# Patient Record
Sex: FEMALE | Race: WHITE | URBAN - METROPOLITAN AREA
[De-identification: names, ages, dates, MRNs, and addresses within clinical notes are randomized per-mention and may not be internally consistent; named-entity substitution may affect disease eponyms.]

---

## 2022-05-23 ENCOUNTER — DOCUMENTATION ONLY (OUTPATIENT)
Dept: PEDIATRIC CARDIOLOGY | Facility: CLINIC | Age: 22
End: 2022-05-23

## 2022-12-13 ENCOUNTER — TELEPHONE (OUTPATIENT)
Dept: PEDIATRIC CARDIOLOGY | Facility: CLINIC | Age: 22
End: 2022-12-13
Payer: MEDICAID

## 2022-12-13 NOTE — TELEPHONE ENCOUNTER
Pt called asking about what anxiety medication she would be able to take with QT prolonged and wanted to come in to see Dr. Alonso for appt abt it, but could possibly just be helped over the phone

## 2022-12-13 NOTE — TELEPHONE ENCOUNTER
Last eCw note in chart. History of prolonged mild QTc prolongation (454 msec). Would like advice on Anxiety medication she could take. Do you want to see her in office for this or advise on medications she could take with history? She's not due for follow up until May 2023.    Thanks,   Melissa

## 2022-12-13 NOTE — PROGRESS NOTES
MALENA ERICKSON   21 Y old Female, : 2000   Account Number: [id number]   [id number] [id number]   Home: [phone]    Guarantor: NEIDA ERICKSON Insurance: [facility]   PCP: [facility] Referring: Tiffany German NP    Appointment Facility: [facility]     2022 Progress Notes: Dalia Tejeda MD          Reason for Appointment   1. Aortic stenosis established patient   History of Present Illness   Aortic stenosis:   I had the pleasure of seeing this patient in the [place] satellite office today. 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 She also has a bicuspid aortic valve with mild insufficiency. The patient a history of borderline prolonged QTc on electrocardiogram. At her last visit, her QT interval was stable at 461 msec. The patient was last seen 1 year ago and returns today for follow up since obtaining a cMR on 2021 which demonstrated hypoplastic ascending aorta with isthmus coarctation. There are no complaints of chest pain, dizziness, exercise intolerance, tachycardia, palpitations, syncope, shortness of breath, or arrhythmia.    Current Medications   None      Past Medical History   Coarctation of the aorta status post repair.     Bicuspid aortic valve - no stenosis.     Aortic valve insufficiency, mild.     Prolonged QT interval.     MRA brain 2021 - no berry aneurysms noted .     Seasonal/ Environmental Allergies.     Pregnancy - uncomplicated .     Surgical History   Aortic arch reconstruction with bovine pericardium Dr. VALERIE [facility] at [facility] [place] 2002   Family History   Mother: alive   Father: alive, Myocardial Infarction at 34 Years of Age, S/P Stent Placement   Brother: alive, diagnosed with Hypercholesterolemia, Congenital Heart Disease   Maternal Grand Mother: , diagnosed  with Diabetes   Paternal Grand Mother: alive, diagnosed with Hypercholesterolemia, Diabetes   1 brother(s) - healthy.    There is no direct family history of sudden death, arrhythmia, stroke, cancer, or other inheritable disorders.   Social History   Observations: no.   Language: no language barriers.   Tobacco Use Are you a: never smoker.   Smokers in the household: No.   Exercise/activities: None.   Number of siblings: 1.   Caffeine: occasionally.   Alcohol: no.   Drugs: no.    Allergies   Reglan   Hospitalization/Major Diagnostic Procedure   As related to aortic arch reconstruction 11/2002   Review of Systems   Genetics:   Syndrome none.   Constitutional:   Fatigue none. Fever none. Loss of appetite none. Weakness none. Weight no problems reported.   Neurologic:   Syncope none. Dizziness none. Headaches none. Seizures absent.   Ophthalmologic:   Contacts or glasses none. Diminished vision none.   Ear, nose, throat:   Eyes no problems present. Mouth and throat no problems noted. Upper airway obstruction none present. Nasal congestion none.   Respiratory:   Asthma none. Tachypnea none. Cough none. Shortness of breath associated with chest tightness or wheeze. Wheezing none.   Cardiovascular:   See HPI for details.   Gastrointestinal:   Stomach no nausea or vomiting. Bowel no diarrhea, no constipation. Abdomen No complaints.   Endocrine:   Thyroid disease none. Diabetes none.   Genitourinary:   Renal disease no problems noted. Urinary tract infection none.   Musculoskeletal:   Joint pain none. Joint swelling none. Muscle no cramping, aching, or stiffness.   Dermatologic:   Itching none. Rash none.   Hematology, oncology:   Anemia none. Abnormal bleeding none. Clotting disorder none.   Allergy:   Seasonal/Environmental yes. Food none. Latex none.   Psychology:   ADD or ADHD present. Nervousness none . Mental Illness none . Anxiety none. Depression none.      Vital Signs   Ht 169 cm, Wt 90 kg, BMI 31.51, heart rate  (HR) 66 bpm, blood pressure (BP) Right Arm: 123/53 mmHg, repeat blood pressure (BP) Manual: 120/54 mmHg, respiratory rate (RR) 16.   Physical Examination   General:   General Appearance: pleasant. Nutrition well nourished. Distress none. Cyanosis none.   HEENT:   Head: atraumatic, normocephalic.   Neck:   Neck: supple.   Chest:   Shape and Expansion: equal expansion bilaterally, no retractions, no grunting. Chest wall: surgical incisions well healed. Breath Sounds: clear to auscultation, no wheezing, rhonchi, crackles, or stridor.   Heart:   Inspection: normal and acyanotic. Palpation: normal point of maximal impulse. Rate: regular. Rhythm: regular. S1: normal. S2: physiologically split. Clicks: early systolic. Systolic murmurs: II/VI, systolic, medium pitch, left upper sternal border, radiation to the back/left scapula. Diastolic murmurs: none present. Rubs, Gallops: none. Pulses: brachial artery equals femoral artery without delay.   Abdomen:   Shape: normal. Palpation soft. Tenderness: none.   Extremities:   Clubbing: no. Cyanosis: no. Edema: no. Pulses: 2+ bilaterally.   Neurological:   Motor: normal strength bilaterally. Coordination: normal.      Assessments      1. 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 Coarctation of aorta - Q25.1   3. Nonrheumatic aortic (valve) insufficiency - I35.1   In summary, Moira is status post a successful aortic arch reconstruction and coarctation repair. Her mild residual arch narrowing has remained stable since her last evaluation. Her bicuspid aortic valve remains non-stenotic and with only mild insufficiency which is stable as well. We will continue to monitor these. At this point, she does not require any intervention. Lastly, Moira's electrocardiogram today demonstrated mild QTc prolongation (454 msec).which has remained stable. I discussed with her that it would be prudent to avoid drugs on the QT prolonging drug list but if not possible, she would  require EKG monitoring while taking these. I will continue to monitor the QTc closely over time. I will plan to see her back in 1 year. Thank you for allowing me to follow this patient with you.   Procedures   Electrocardiogram:   Axis Superior axis deviation.   Rhythm Normal sinus rhythm.   Cardiac intervals Borderline long QTc (454 msec).   Ventricular forces Inferolateral T wave inversion.   Echocardiogram:   Findings Bicuspid aortic valve with mild aortic valve insufficiency via two separate jets. No aortic valve stenosis. Mild residual coarctation with a peak gradient of 45 mm Hg and a mean of 20 mm Hg. No ascending aorta dilation. No mitral inflow obstruction was present. No significant atrioventricular valve insufficiency. Normal left ventricular size and function. No pericardial effusion.               Preventive Medicine   Counseling: Exercise - No activity restrictions. SBE prophylaxis - None indicated.    Procedure Codes   76867 Doppler Complete   63722 Color Flow   56699 2D Congenital Complete   11272 Electrocardiogram (global)   Follow Up   1 Year (Reason: Echocardiogram,Electrocardiogram)

## 2022-12-14 ENCOUNTER — PATIENT MESSAGE (OUTPATIENT)
Dept: PEDIATRIC CARDIOLOGY | Facility: CLINIC | Age: 22
End: 2022-12-14
Payer: MEDICAID

## 2022-12-14 NOTE — TELEPHONE ENCOUNTER
Left message for patient to call back. Per Dr. Alonso patient can take anxiety medication, but will need to let us know which one because we may need to get an EKG about 1 week after starting to get her QTc due to her history of prolonged QT. Will try to call again later today or message via [place].

## 2022-12-22 NOTE — TELEPHONE ENCOUNTER
Spoke with Moira regarding Dr. Alonso's recommendation. She will call us back with the name of anxiety medication and we will set up an appt for EKG.

## 2023-02-07 ENCOUNTER — OFFICE VISIT (OUTPATIENT)
Dept: PEDIATRIC CARDIOLOGY | Facility: CLINIC | Age: 23
End: 2023-02-07
Payer: MEDICAID

## 2023-02-07 VITALS
SYSTOLIC BLOOD PRESSURE: 136 MMHG | RESPIRATION RATE: 27 BRPM | HEART RATE: 75 BPM | DIASTOLIC BLOOD PRESSURE: 72 MMHG | BODY MASS INDEX: 33.75 KG/M2 | HEIGHT: 66 IN | WEIGHT: 210 LBS

## 2023-02-07 DIAGNOSIS — Q23.1 BICUSPID AORTIC VALVE: ICD-10-CM

## 2023-02-07 DIAGNOSIS — Q24.9 SHONE'S SYNDROME: Primary | ICD-10-CM

## 2023-02-07 DIAGNOSIS — F41.9 ANXIETY: ICD-10-CM

## 2023-02-07 DIAGNOSIS — Q25.1 COARCTATION OF AORTA: ICD-10-CM

## 2023-02-07 PROBLEM — Q24.8 SHONE'S SYNDROME: Status: ACTIVE | Noted: 2021-04-08

## 2023-02-07 PROBLEM — Q23.81 BICUSPID AORTIC VALVE: Status: ACTIVE | Noted: 2023-02-07

## 2023-02-07 PROCEDURE — 3078F PR MOST RECENT DIASTOLIC BLOOD PRESSURE < 80 MM HG: ICD-10-PCS | Mod: CPTII,S$GLB,, | Performed by: PEDIATRICS

## 2023-02-07 PROCEDURE — 3008F PR BODY MASS INDEX (BMI) DOCUMENTED: ICD-10-PCS | Mod: CPTII,S$GLB,, | Performed by: PEDIATRICS

## 2023-02-07 PROCEDURE — 1160F RVW MEDS BY RX/DR IN RCRD: CPT | Mod: CPTII,S$GLB,, | Performed by: PEDIATRICS

## 2023-02-07 PROCEDURE — 3078F DIAST BP <80 MM HG: CPT | Mod: CPTII,S$GLB,, | Performed by: PEDIATRICS

## 2023-02-07 PROCEDURE — 1159F PR MEDICATION LIST DOCUMENTED IN MEDICAL RECORD: ICD-10-PCS | Mod: CPTII,S$GLB,, | Performed by: PEDIATRICS

## 2023-02-07 PROCEDURE — 1160F PR REVIEW ALL MEDS BY PRESCRIBER/CLIN PHARMACIST DOCUMENTED: ICD-10-PCS | Mod: CPTII,S$GLB,, | Performed by: PEDIATRICS

## 2023-02-07 PROCEDURE — 99213 PR OFFICE/OUTPT VISIT, EST, LEVL III, 20-29 MIN: ICD-10-PCS | Mod: 25,S$GLB,, | Performed by: PEDIATRICS

## 2023-02-07 PROCEDURE — 1159F MED LIST DOCD IN RCRD: CPT | Mod: CPTII,S$GLB,, | Performed by: PEDIATRICS

## 2023-02-07 PROCEDURE — 3008F BODY MASS INDEX DOCD: CPT | Mod: CPTII,S$GLB,, | Performed by: PEDIATRICS

## 2023-02-07 PROCEDURE — 3075F PR MOST RECENT SYSTOLIC BLOOD PRESS GE 130-139MM HG: ICD-10-PCS | Mod: CPTII,S$GLB,, | Performed by: PEDIATRICS

## 2023-02-07 PROCEDURE — 99213 OFFICE O/P EST LOW 20 MIN: CPT | Mod: 25,S$GLB,, | Performed by: PEDIATRICS

## 2023-02-07 PROCEDURE — 93000 ELECTROCARDIOGRAM COMPLETE: CPT | Mod: S$GLB,,, | Performed by: PEDIATRICS

## 2023-02-07 PROCEDURE — 93000 PR ELECTROCARDIOGRAM, COMPLETE: ICD-10-PCS | Mod: S$GLB,,, | Performed by: PEDIATRICS

## 2023-02-07 PROCEDURE — 3075F SYST BP GE 130 - 139MM HG: CPT | Mod: CPTII,S$GLB,, | Performed by: PEDIATRICS

## 2023-02-07 RX ORDER — BUPROPION HCL 150 MG
150 TABLET, EXTENDED RELEASE 24 HR ORAL
COMMUNITY
Start: 2023-01-26 | End: 2023-05-18

## 2023-02-07 NOTE — PROGRESS NOTES
Thank you for referring your patient Moira Bravo to the Pediatric Cardiology clinic for consultation. Please review my findings below and feel free to contact for me for any questions or concerns.    Moira Bravo is a 22 y.o. female seen in clinic today accompanied by her spouse and child for Aortic Stenosis    ASSESSMENT/PLAN:  1. Shone's syndrome    2. Coarctation of aorta    3. Bicuspid aortic valve    4. Anxiety  Assessment & Plan:  On Wellbutrin  Normal EKG, no QT prolongation      Preventive Medicine:  SBE prophylaxis - None indicated  Exercise - No activity restrictions    Follow Up:  Follow up in about 3 months (around 5/7/2023) for Recheck with EKG and Echo.      SUBJECTIVE:  HPI  Moira Bravo is a 22 y.o. 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 She also has a bicuspid aortic valve with mild insufficiency. The patient has a history of borderline prolonged QTc on electrocardiogram. At her last visit, her QT interval was stable at 454 msec. The patient was last seen 8 months ago and returns today for early follow up to obtain an electrocardiogram to check her QT interval since beginning anxiety medication. There are no complaints of chest pain, shortness of breath, palpitations, decreased activity, exercise intolerance, tachycardia, dizziness, syncope, documented arrhythmias, or headaches.    Review of patient's allergies indicates:  No Known Allergies    Current Outpatient Medications:     WELLBUTRIN  mg TB24 tablet, Take 150 mg by mouth., Disp: , Rfl:   Past Medical History:   Diagnosis Date    Aortic valve insufficiency     Bicuspid aortic valve     Coarctation of aorta     s/p repair    History of MRI of brain 01/2021    no berry aneurysms noted    Prolonged QT interval     Seasonal allergies       Past Surgical History:   Procedure Laterality Date    AORTIC ARCH REPAIR  2002    with bovine pericardium by   "VALERIE Young at [facility]     Family History   Problem Relation Age of Onset    Heart attacks under age 50 Father 34        s/p stent placement    Congenital heart disease Brother     Hyperlipidemia Brother     Diabetes Maternal Grandmother     Diabetes Paternal Grandmother     Hyperlipidemia Paternal Grandmother       There is no direct family history of sudden death, arrythmia, hypertension, stroke, cancer , or other inheritable disorders.  Social History     Socioeconomic History    Marital status: Single   Tobacco Use    Smoking status: Unknown   Social History Narrative    Lives with grandma. In college. Regular caffeine through tea and soda. Regular exercise.        Interval Hospitalizations/Procedures:  none    Review of Systems   A comprehensive review of symptoms was completed and negative except as noted above.    OBJECTIVE:  Vital signs  Vitals:    02/07/23 1024 02/07/23 1025   BP: (!) 145/79 136/72   BP Location: Right arm Right arm   Patient Position: Lying Lying   BP Method: Large (Automatic) Large (Manual)   Pulse: 75    Resp: (!) 27    Weight: 95.2 kg (209 lb 15.8 oz)    Height: 5' 6.14" (1.68 m)       Body mass index is 33.75 kg/m².    Physical Exam  Vitals reviewed.   Constitutional:       General: She is not in acute distress.     Appearance: Normal appearance. She is not ill-appearing, toxic-appearing or diaphoretic.   HENT:      Head: Normocephalic and atraumatic.   Cardiovascular:      Rate and Rhythm: Normal rate and regular rhythm.      Pulses: Normal pulses.           Radial pulses are 2+ on the right side.        Femoral pulses are 2+ on the right side.     Heart sounds: S1 normal and S2 normal. Murmur heard.     No friction rub. No gallop.   Pulmonary:      Effort: Pulmonary effort is normal.      Breath sounds: Normal breath sounds.   Skin:     General: Skin is warm and dry.      Capillary Refill: Capillary refill takes less than 2 seconds.   Neurological:      Mental " Status: She is alert.   Psychiatric:         Mood and Affect: Mood normal.        Electrocardiogram:  Normal sinus rhythm with normal cardiac intervals, superior axis deviation, non-specific ST-T wave flattening, normal QTc    Echocardiogram:  not performed today        Adolph Alonso MD  [facility]  [facility] [address] [address]  Dept: [phone]  Dept Fax: [phone]

## 2023-05-18 ENCOUNTER — OFFICE VISIT (OUTPATIENT)
Dept: PEDIATRIC CARDIOLOGY | Facility: CLINIC | Age: 23
End: 2023-05-18
Payer: MEDICAID

## 2023-05-18 VITALS
HEIGHT: 66 IN | WEIGHT: 211.19 LBS | SYSTOLIC BLOOD PRESSURE: 119 MMHG | DIASTOLIC BLOOD PRESSURE: 63 MMHG | RESPIRATION RATE: 22 BRPM | HEART RATE: 62 BPM | BODY MASS INDEX: 33.94 KG/M2

## 2023-05-18 DIAGNOSIS — Z87.898 HISTORY OF PROLONGED Q-T INTERVAL ON ECG: ICD-10-CM

## 2023-05-18 DIAGNOSIS — Q23.1 BICUSPID AORTIC VALVE: ICD-10-CM

## 2023-05-18 DIAGNOSIS — Q25.1 COARCTATION OF AORTA: Primary | ICD-10-CM

## 2023-05-18 PROBLEM — R94.110: Status: ACTIVE | Noted: 2023-05-18

## 2023-05-18 PROCEDURE — 1159F PR MEDICATION LIST DOCUMENTED IN MEDICAL RECORD: ICD-10-PCS | Mod: CPTII,S$GLB,, | Performed by: PEDIATRICS

## 2023-05-18 PROCEDURE — 3008F PR BODY MASS INDEX (BMI) DOCUMENTED: ICD-10-PCS | Mod: CPTII,S$GLB,, | Performed by: PEDIATRICS

## 2023-05-18 PROCEDURE — 3008F BODY MASS INDEX DOCD: CPT | Mod: CPTII,S$GLB,, | Performed by: PEDIATRICS

## 2023-05-18 PROCEDURE — 99214 OFFICE O/P EST MOD 30 MIN: CPT | Mod: 25,S$GLB,, | Performed by: PEDIATRICS

## 2023-05-18 PROCEDURE — 93000 PR ELECTROCARDIOGRAM, COMPLETE: ICD-10-PCS | Mod: S$GLB,,, | Performed by: PEDIATRICS

## 2023-05-18 PROCEDURE — 3078F PR MOST RECENT DIASTOLIC BLOOD PRESSURE < 80 MM HG: ICD-10-PCS | Mod: CPTII,S$GLB,, | Performed by: PEDIATRICS

## 2023-05-18 PROCEDURE — 1159F MED LIST DOCD IN RCRD: CPT | Mod: CPTII,S$GLB,, | Performed by: PEDIATRICS

## 2023-05-18 PROCEDURE — 93000 ELECTROCARDIOGRAM COMPLETE: CPT | Mod: S$GLB,,, | Performed by: PEDIATRICS

## 2023-05-18 PROCEDURE — 99214 PR OFFICE/OUTPT VISIT, EST, LEVL IV, 30-39 MIN: ICD-10-PCS | Mod: 25,S$GLB,, | Performed by: PEDIATRICS

## 2023-05-18 PROCEDURE — 1160F RVW MEDS BY RX/DR IN RCRD: CPT | Mod: CPTII,S$GLB,, | Performed by: PEDIATRICS

## 2023-05-18 PROCEDURE — 1160F PR REVIEW ALL MEDS BY PRESCRIBER/CLIN PHARMACIST DOCUMENTED: ICD-10-PCS | Mod: CPTII,S$GLB,, | Performed by: PEDIATRICS

## 2023-05-18 PROCEDURE — 3074F SYST BP LT 130 MM HG: CPT | Mod: CPTII,S$GLB,, | Performed by: PEDIATRICS

## 2023-05-18 PROCEDURE — 3078F DIAST BP <80 MM HG: CPT | Mod: CPTII,S$GLB,, | Performed by: PEDIATRICS

## 2023-05-18 PROCEDURE — 3074F PR MOST RECENT SYSTOLIC BLOOD PRESSURE < 130 MM HG: ICD-10-PCS | Mod: CPTII,S$GLB,, | Performed by: PEDIATRICS

## 2023-05-18 NOTE — ASSESSMENT & PLAN NOTE
Moira is status post a successful aortic arch reconstruction and coarctation repair. Her mild residual arch narrowing has remained stable over the last several evaluations. Additionally, she has excellent lower extremity pulses and no upper/lower extremity blood pressure gradient.  At this point, the residual coarctation does not require treatment. I will continue to monitor over time.   Patients with a history of coarctation are at increased risk for hypertension. Today her blood pressure is acceptable and improved since her last visit in February.  I will continue to follow closely for the need for antihypertensives.

## 2023-05-18 NOTE — ASSESSMENT & PLAN NOTE
Moira's electrocardiogram today did not demonstrate QTc prolongation but it has been borderline to mildly elevated in the past. I would recommend she continue to avoid drugs on the QT prolonging drug list but if not possible, she would require EKG monitoring while taking these. I will continue to monitor the QTc closely over time.

## 2023-05-18 NOTE — PROGRESS NOTES
Thank you for referring your patient Moira Bravo to the Pediatric Cardiology clinic for consultation. Please review my findings below and feel free to contact for me for any questions or concerns.    Moira Bravo is a 22 y.o. female seen in clinic today for Aortic Stenosis    ASSESSMENT/PLAN:  1. Coarctation of aorta  Assessment & Plan:  Moira is status post a successful aortic arch reconstruction and coarctation repair. Her mild residual arch narrowing has remained stable over the last several evaluations. Additionally, she has excellent lower extremity pulses and no upper/lower extremity blood pressure gradient.  At this point, the residual coarctation does not require treatment. I will continue to monitor over time.   Patients with a history of coarctation are at increased risk for hypertension. Today her blood pressure is acceptable and improved since her last visit in February.  I will continue to follow closely for the need for antihypertensives.      Orders:  -     Pediatric Echo; Future    2. Bicuspid aortic valve  Assessment & Plan:  Moira has a bicuspid aortic valve with no stenosis and mild insufficiency. There is no associated ascending aorta dilation. This remains stable since her evaluation last year. She requires no treatment at this point. Although unlikely to cause her difficulty until she ages further, she will need to be followed for the development of aortic stenosis and for ascending aortopathy.    Orders:  -     Pediatric Echo; Future    3. History of prolonged Q-T interval on ECG  Assessment & Plan:  Moira's electrocardiogram today did not demonstrate QTc prolongation but it has been borderline to mildly elevated in the past. I would recommend she continue to avoid drugs on the QT prolonging drug list but if not possible, she would require EKG monitoring while taking these. I will continue to monitor the QTc closely over time.        Preventive Medicine:  SBE prophylaxis - None  indicated  Exercise - No activity restrictions    Follow Up:  Follow up in about 1 year (around 5/18/2024) for Recheck with EKG and Echo, Manual Blood Pressure.    SUBJECTIVE:  PAOLA Bravo is a 22 y.o. 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 She also has a bicuspid aortic valve with mild insufficiency. The patient has a history of borderline prolonged QTc on electrocardiogram. At her second to last visit, her QT interval was stable at 454 msec. The patient was last seen three months ago and returns today for a follow up. Complaints include none.  There are no complaints of chest pain, shortness of breath, palpitations, decreased activity, exercise intolerance, tachycardia, dizziness, syncope, or documented arrhythmias.    Review of patient's allergies indicates:  No Known Allergies  No current outpatient medications on file.  Past Medical History:   Diagnosis Date    Aortic valve insufficiency     Bicuspid aortic valve     Coarctation of aorta     s/p repair    History of MRI of brain 01/2021    no berry aneurysms noted    Prolonged QT interval     Seasonal allergies       Past Surgical History:   Procedure Laterality Date    AORTIC ARCH REPAIR  2002    with bovine pericardium by Dr. VALERIE [facility] at [facility]     Family History   Problem Relation Age of Onset    Heart attacks under age 50 Father 34        s/p stent placement    Congenital heart disease Brother     Hyperlipidemia Brother     Diabetes Maternal Grandmother     Diabetes Paternal Grandmother     Hyperlipidemia Paternal Grandmother       There is no direct family history of sudden death, arrythmia, hypertension, stroke, cancer , or other inheritable disorders.  Social History     Socioeconomic History    Marital status: Single   Tobacco Use    Smoking status: Unknown   Social History Narrative    Lives with grandma. In college. Regular caffeine through  "tea and soda. Regular exercise.        Review of Systems   A comprehensive review of symptoms was completed and negative except as noted above.    OBJECTIVE:  Vital signs  Vitals:    05/18/23 1022 05/18/23 1024 05/18/23 1108   BP: 132/70 128/64 119/63   BP Location: Right arm Right arm Left leg   Patient Position: Lying Lying Lying   BP Method: Large (Automatic) Large (Manual) Large (Automatic)   Pulse: 62     Resp: (!) 22     Weight: 95.8 kg (211 lb 3.2 oz)     Height: 5' 5.75" (1.67 m)        Body mass index is 34.35 kg/m².    Physical Exam  Vitals reviewed.   Constitutional:       General: She is not in acute distress.     Appearance: Normal appearance.   HENT:      Head: Normocephalic and atraumatic.      Nose: Nose normal.      Mouth/Throat:      Mouth: Mucous membranes are moist.   Cardiovascular:      Rate and Rhythm: Normal rate and regular rhythm.      Pulses: Normal pulses.           Radial pulses are 2+ on the right side.        Femoral pulses are 2+ on the right side.     Heart sounds: S1 normal and S2 normal. Murmur (2/6, systolic, LUSB, radiation to back/left scapula) heard.     No friction rub. No gallop.   Pulmonary:      Effort: Pulmonary effort is normal.      Breath sounds: Normal breath sounds.   Abdominal:      General: There is no distension.      Palpations: Abdomen is soft.      Tenderness: There is no abdominal tenderness.   Skin:     General: Skin is warm and dry.      Capillary Refill: Capillary refill takes less than 2 seconds.   Neurological:      General: No focal deficit present.      Mental Status: She is alert.        Electrocardiogram:  Low right atrial rhythm  Left axis deviation  Normal Qtc (430 ms)  Inferolateral T wave inversion    Echocardiogram:  Coarctation of the aorta  -Status post aortic arch reconstruction with bovine pericardium, Dr. Young, [facility], 2002  Mild narrowing of the aorta at the level of the isthmus (1.1 cm, Z score -2.45)  Mildly increased flow velocity " through the aortic arch (peak velocity 3.48 m/s,  peak gradient 46 mm Hg and a mean of 20 mm Hg).  Bicuspid aortic valve with mild aortic valve insufficiency via two separate jets. No aortic valve stenosis.  Normal left ventricle structure and size.  Normal mitral valve velocity.  Trivial mitral valve insufficiency.    MRA Head 1/5/21:  No aneurysm or vascular malformation    MRI/MRA chest 6/1/21:  Hypoplastic ascending aorta with isthmus coarctation.   Aortic measurements are as follows:   Annulus: 22 x 25 mm   Sinus of Valsalva: 28 x 29 mm   Sinotubular junction: 23 x 24 mm   Ascending aorta at level of PA: 20 x 21 mm   Aortic arch proximal to coarct:  17 x 17 mm   At level of coarct: 11 x 12 mm   Descending aorta distal to coarct: 19 x 20 mm   Distal descending aorta: 19 x 19 mm      Dalia Tejeda MD  [facility]  [facility]  [address] [id number]  Dept: [phone]  Dept Fax: [phone]

## 2023-05-18 NOTE — ASSESSMENT & PLAN NOTE
Moira has a bicuspid aortic valve with no stenosis and mild insufficiency. There is no associated ascending aorta dilation. This remains stable since her evaluation last year. She requires no treatment at this point. Although unlikely to cause her difficulty until she ages further, she will need to be followed for the development of aortic stenosis and for ascending aortopathy.

## 2023-07-20 ENCOUNTER — OFFICE VISIT (OUTPATIENT)
Dept: PEDIATRIC CARDIOLOGY | Facility: CLINIC | Age: 23
End: 2023-07-20
Payer: MEDICAID

## 2023-07-20 VITALS
RESPIRATION RATE: 19 BRPM | BODY MASS INDEX: 33.45 KG/M2 | HEIGHT: 66 IN | SYSTOLIC BLOOD PRESSURE: 128 MMHG | HEART RATE: 66 BPM | WEIGHT: 208.13 LBS | DIASTOLIC BLOOD PRESSURE: 62 MMHG

## 2023-07-20 DIAGNOSIS — Z87.898 HISTORY OF PROLONGED Q-T INTERVAL ON ECG: ICD-10-CM

## 2023-07-20 DIAGNOSIS — Q25.1 COARCTATION OF AORTA: ICD-10-CM

## 2023-07-20 DIAGNOSIS — Q23.1 BICUSPID AORTIC VALVE: ICD-10-CM

## 2023-07-20 DIAGNOSIS — R03.0 ELEVATED BLOOD PRESSURE READING: Primary | ICD-10-CM

## 2023-07-20 PROCEDURE — 3008F PR BODY MASS INDEX (BMI) DOCUMENTED: ICD-10-PCS | Mod: CPTII,S$GLB,, | Performed by: PEDIATRICS

## 2023-07-20 PROCEDURE — 3074F SYST BP LT 130 MM HG: CPT | Mod: CPTII,S$GLB,, | Performed by: PEDIATRICS

## 2023-07-20 PROCEDURE — 1159F MED LIST DOCD IN RCRD: CPT | Mod: CPTII,S$GLB,, | Performed by: PEDIATRICS

## 2023-07-20 PROCEDURE — 99214 OFFICE O/P EST MOD 30 MIN: CPT | Mod: S$GLB,,, | Performed by: PEDIATRICS

## 2023-07-20 PROCEDURE — 3008F BODY MASS INDEX DOCD: CPT | Mod: CPTII,S$GLB,, | Performed by: PEDIATRICS

## 2023-07-20 PROCEDURE — 1160F RVW MEDS BY RX/DR IN RCRD: CPT | Mod: CPTII,S$GLB,, | Performed by: PEDIATRICS

## 2023-07-20 PROCEDURE — 3074F PR MOST RECENT SYSTOLIC BLOOD PRESSURE < 130 MM HG: ICD-10-PCS | Mod: CPTII,S$GLB,, | Performed by: PEDIATRICS

## 2023-07-20 PROCEDURE — 3078F DIAST BP <80 MM HG: CPT | Mod: CPTII,S$GLB,, | Performed by: PEDIATRICS

## 2023-07-20 PROCEDURE — 1160F PR REVIEW ALL MEDS BY PRESCRIBER/CLIN PHARMACIST DOCUMENTED: ICD-10-PCS | Mod: CPTII,S$GLB,, | Performed by: PEDIATRICS

## 2023-07-20 PROCEDURE — 99214 PR OFFICE/OUTPT VISIT, EST, LEVL IV, 30-39 MIN: ICD-10-PCS | Mod: S$GLB,,, | Performed by: PEDIATRICS

## 2023-07-20 PROCEDURE — 3078F PR MOST RECENT DIASTOLIC BLOOD PRESSURE < 80 MM HG: ICD-10-PCS | Mod: CPTII,S$GLB,, | Performed by: PEDIATRICS

## 2023-07-20 PROCEDURE — 1159F PR MEDICATION LIST DOCUMENTED IN MEDICAL RECORD: ICD-10-PCS | Mod: CPTII,S$GLB,, | Performed by: PEDIATRICS

## 2023-07-20 NOTE — ASSESSMENT & PLAN NOTE
Moira is status post a successful aortic arch reconstruction and coarctation repair. Her mild residual arch narrowing has remained stable over the last several evaluations. Additionally, she has excellent lower extremity pulses and no upper/lower extremity blood pressure gradient.  At this point, the residual coarctation does not require treatment. I will continue to monitor over time, next echo May 2024

## 2023-07-20 NOTE — ASSESSMENT & PLAN NOTE
Moira has a bicuspid aortic valve with no stenosis and mild insufficiency. There is no associated ascending aorta dilation. This remained stable since her evaluation on her most recent evaluations. She requires no treatment at this point. Although unlikely to cause her difficulty until she ages further, she will need to be followed for the development of aortic stenosis and for ascending aortopathy.

## 2023-07-20 NOTE — ASSESSMENT & PLAN NOTE
Moira's electrocardiogram at her last visit in May 2023 did not demonstrate QTc prolongation but it has been borderline to mildly elevated in the past. I would recommend she continue to avoid drugs on the QT prolonging drug list but if not possible, she would require EKG monitoring while taking these. I will continue to monitor the QTc closely over time.

## 2023-07-20 NOTE — ASSESSMENT & PLAN NOTE
Patients with a history of coarctation are at increased risk for hypertension. Today, Moira's  blood pressure is at the upper limit of normal. Her blood pressure was normal in May but mildly elevated in February. I would expect a maximal blood pressure of approximately 128/80 mm Hg.  After some discussion, we decided to perform additional testing including a laboratory evaluation and a renal ultrasound.  She will continue to follow her blood pressure at home an bring a log to the next visit.  She will also bring her cuff so we can evaluate the size and calibration of the cuff.  At the time of follow-up,  I will recheck her blood pressure and review the test results.  Based upon that visit, I will either recommend expectant management or begin pharmacological therapy.

## 2023-12-15 ENCOUNTER — TELEPHONE (OUTPATIENT)
Dept: PEDIATRIC CARDIOLOGY | Facility: CLINIC | Age: 23
End: 2023-12-15
Payer: MEDICAID

## 2023-12-15 NOTE — TELEPHONE ENCOUNTER
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 I told her that I would see what we are able to do, but I could not guarantee anything. I have drafted a letter in advance per Elysia.     Patient's #[phone]  Moira stated that she would  the letter from the [place] office on December 21st, if we were able to accommodate this request.

## 2023-12-15 NOTE — PROGRESS NOTES
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 I told her that I would see what we are able to do, but I could not guarantee anything. I have drafted a letter in advance.     Patient's #[phone]  Moira stated that she would  the letter from the [place] office on December 21st, if we were able to accommodate this request.      Topical Sulfur Applications Counseling: Topical Sulfur Counseling: Patient counseled that this medication may cause skin irritation or allergic reactions.  In the event of skin irritation, the patient was advised to reduce the amount of the drug applied or use it less frequently.   The patient verbalized understanding of the proper use and possible adverse effects of topical sulfur application.  All of the patient's questions and concerns were addressed.

## 2023-12-15 NOTE — LETTER
December 15, 2023    Moira Bravo  : 2000  [address] [address]             [facility] - [address] [address]  Phone: [phone]  Fax: [phone] To whom it may concern,      Moira has congenital heart disease. Due to this, she should be permitted to sign a waiver allowing her to be exempt her from the COVID-19 vaccine.       If you have any questions or concerns, please don't hesitate to call.      Sincerely,          Dalia Tejeda MD

## 2023-12-18 NOTE — TELEPHONE ENCOUNTER
Pt with hx of HTN, coarctation of aorta, bicuspid aortic valve, prolonged QT. Pt requesting letter to state she is exempt from covid vaccine. OK to provide letter? Please advise.

## 2023-12-19 NOTE — TELEPHONE ENCOUNTER
Patient called again regarding obtaining letter. She states if we are able to accommodate the request she would need to  letter this Thursday in the [place] office as it is due this coming Monday.

## 2023-12-21 NOTE — TELEPHONE ENCOUNTER
Updated letter, printed, and stamped.  Betty is scanning into pt's media file.  I notified pt that it will be ready for pick-up any time today before 2:00pm.

## 2023-12-21 NOTE — TELEPHONE ENCOUNTER
There is no evidence that the COVID vaccine should affect her heart.  However, if the patient is uncomfortable receiving the vaccine due to concerns about her heart, I am happy to provide the letter

## 2024-07-12 ENCOUNTER — CLINICAL SUPPORT (OUTPATIENT)
Dept: PEDIATRIC CARDIOLOGY | Facility: CLINIC | Age: 24
End: 2024-07-12
Attending: PEDIATRICS
Payer: MEDICAID

## 2024-07-12 ENCOUNTER — OFFICE VISIT (OUTPATIENT)
Dept: PEDIATRIC CARDIOLOGY | Facility: CLINIC | Age: 24
End: 2024-07-12
Payer: MEDICAID

## 2024-07-12 VITALS
BODY MASS INDEX: 32.86 KG/M2 | WEIGHT: 209.38 LBS | HEART RATE: 64 BPM | RESPIRATION RATE: 18 BRPM | DIASTOLIC BLOOD PRESSURE: 72 MMHG | SYSTOLIC BLOOD PRESSURE: 128 MMHG | HEIGHT: 67 IN | OXYGEN SATURATION: 98 %

## 2024-07-12 DIAGNOSIS — Z87.898 HISTORY OF PROLONGED Q-T INTERVAL ON ECG: ICD-10-CM

## 2024-07-12 DIAGNOSIS — I35.1 NONRHEUMATIC AORTIC VALVE INSUFFICIENCY: ICD-10-CM

## 2024-07-12 DIAGNOSIS — Q25.1 COARCTATION OF AORTA: ICD-10-CM

## 2024-07-12 DIAGNOSIS — Q23.1 BICUSPID AORTIC VALVE: ICD-10-CM

## 2024-07-12 DIAGNOSIS — R03.0 ELEVATED BLOOD PRESSURE READING: ICD-10-CM

## 2024-07-12 DIAGNOSIS — Q24.9 SHONE'S SYNDROME: Primary | ICD-10-CM

## 2024-07-12 PROCEDURE — 3008F BODY MASS INDEX DOCD: CPT | Mod: CPTII,S$GLB,, | Performed by: PEDIATRICS

## 2024-07-12 PROCEDURE — 1159F MED LIST DOCD IN RCRD: CPT | Mod: CPTII,S$GLB,, | Performed by: PEDIATRICS

## 2024-07-12 PROCEDURE — 93000 ELECTROCARDIOGRAM COMPLETE: CPT | Mod: S$GLB,,, | Performed by: PEDIATRICS

## 2024-07-12 PROCEDURE — 3074F SYST BP LT 130 MM HG: CPT | Mod: CPTII,S$GLB,, | Performed by: PEDIATRICS

## 2024-07-12 PROCEDURE — 3078F DIAST BP <80 MM HG: CPT | Mod: CPTII,S$GLB,, | Performed by: PEDIATRICS

## 2024-07-12 PROCEDURE — 99214 OFFICE O/P EST MOD 30 MIN: CPT | Mod: 25,S$GLB,, | Performed by: PEDIATRICS

## 2024-07-12 PROCEDURE — 1160F RVW MEDS BY RX/DR IN RCRD: CPT | Mod: CPTII,S$GLB,, | Performed by: PEDIATRICS

## 2024-07-12 NOTE — ASSESSMENT & PLAN NOTE
Moira's electrocardiogram at her last visit in May 2024 did not demonstrate QTc prolongation but it has been borderline to mildly elevated in the past. I would recommend she continue to avoid drugs on the QT prolonging drug list but if not possible, she would require EKG monitoring while taking these. I will continue to monitor the QTc closely over time.

## 2024-07-12 NOTE — ASSESSMENT & PLAN NOTE
Moira is status post a successful aortic arch reconstruction and coarctation repair. Her mild residual arch narrowing has remained stable over the last several evaluations. Additionally, she has excellent lower extremity pulses and no upper/lower extremity blood pressure gradient.  At this point, the residual coarctation does not require treatment. I will continue to monitor over time, next echo July 2025.

## 2024-07-12 NOTE — ASSESSMENT & PLAN NOTE
Moira has a bicuspid aortic valve with no stenosis and moderate aortic valve insufficiency. There is no associated ascending aorta dilation. This remained stable since her evaluation on her most recent evaluations. She requires no treatment at this point. Although unlikely to cause her difficulty until she ages further, she will need to be followed for the development of aortic stenosis and for ascending aortopathy.

## 2024-07-12 NOTE — ASSESSMENT & PLAN NOTE
Patients with a history of coarctation are at increased risk for hypertension. Today, Moira's blood pressure is normal in clinic. I would expect a maximal blood pressure of approximately 130/80 mm Hg. I will continue to monitor her blood pressure over time. 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

## 2024-07-12 NOTE — PROGRESS NOTES
Thank you for referring your patient Moira Bravo to the Pediatric Cardiology clinic for consultation. Please review my findings below and feel free to contact for me for any questions or concerns.    Moira Bravo is a 23 y.o. 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 Shone's syndrome    2. Bicuspid aortic valve  Assessment & Plan:  Moira has a bicuspid aortic valve with no stenosis and moderate aortic valve insufficiency. There is no associated ascending aorta dilation. This remained stable since her evaluation on her most recent evaluations. She requires no treatment at this point. Although unlikely to cause her difficulty until she ages further, she will need to be followed for the development of aortic stenosis and for ascending aortopathy.      3. Nonrheumatic aortic valve insufficiency    4. Coarctation of aorta  Assessment & Plan:  Moira is status post a successful aortic arch reconstruction and coarctation repair. Her mild residual arch narrowing has remained stable over the last several evaluations. Additionally, she has excellent lower extremity pulses and no upper/lower extremity blood pressure gradient.  At this point, the residual coarctation does not require treatment. I will continue to monitor over time, next echo July 2025.      5. Elevated blood pressure reading  Assessment & Plan:  Patients with a history of coarctation are at increased risk for hypertension. Today, Mauras blood pressure is normal in clinic. I would expect a maximal blood pressure of approximately 130/80 mm Hg. I will continue to monitor her blood pressure over time. 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      6. History of prolonged Q-T interval on ECG  Assessment & Plan:  Moira's electrocardiogram at her last visit in May 2024 did not demonstrate QTc prolongation but it has  been borderline to mildly elevated in the past. I would recommend she continue to avoid drugs on the QT prolonging drug list but if not possible, she would require EKG monitoring while taking these. I will continue to monitor the QTc closely over time.      Preventive Medicine:  SBE prophylaxis - None indicated  Exercise - No activity restrictions    Follow Up:  Follow up in about 1 year (around 7/12/2025) for EKG, Echocardiogram.      SUBJECTIVE:  APOLA Bravo is a 23 y.o. whom we follow with Sharrine's complex status post a successful aortic arch reconstruction and coarctation repair, a bicuspid aortic valve with no stenosis and mild insufficiency, and a history of borderline prolonged Qtc on electrocardiogram. The patient was last seen 10 months ago and returns today for late follow up.     In the interim, she obtained a renal US on 6/14/24, which demonstrated no evidence of renal artery stenosis. The patient also obtained laboratory testing on 11/10/23 including a CMP, HgbA1c, T4 Free, (nonfasting/fasting) lipid panel, TSH, and a urinalysis. The nonfasting/fasting lipid panel demonstrated cholesterol 194 mg/dL, triglycerides 111 mg/dL,   mg/dL,  and HDL 5.1 mg/dL. There are no complaints of chest pain, shortness of breath, palpitations, decreased activity, exercise intolerance, tachycardia, dizziness, syncope, documented arrhythmias, or headaches.    Review of patient's allergies indicates:  No Known Allergies  No current outpatient medications on file.  Past Medical History:   Diagnosis Date    Aortic valve insufficiency     Bicuspid aortic valve     Coarctation of aorta     s/p repair    History of MRI of brain 01/2021    no berry aneurysms noted    Seasonal allergies       Past Surgical History:   Procedure Laterality Date    AORTIC ARCH REPAIR  2002    with bovine pericardium by Dr. VALERIE [facility] at [facility]     Family History   Problem Relation Name Age of Onset    Heart  "attacks under age 50 Father  34        s/p stent placement    Congenital heart disease Brother      Hyperlipidemia Brother      Diabetes Maternal Grandmother      Diabetes Paternal Grandmother      Hyperlipidemia Paternal Grandmother        There is no direct family history of sudden death, arrythmia, hypertension, stroke, cancer , or other inheritable disorders.  Social History     Socioeconomic History    Marital status: Single   Tobacco Use    Smoking status: Unknown   Social History Narrative    Lives with: daughter and her daughter's dad    In college    Active: light activity    No smokers    Caffeine: no       Review of Systems   A comprehensive review of symptoms was completed and negative except as noted above.    OBJECTIVE:  Vital signs  Vitals:    07/12/24 0949 07/12/24 0950   BP: 128/80 128/72   BP Location: Right arm Right arm   Patient Position: Lying Lying   BP Method: Large (Automatic) Large (Manual)   Pulse: 64    Resp: 18    SpO2: 98%    Weight: 95 kg (209 lb 6.4 oz)    Height: 5' 6.54" (1.69 m)       Body mass index is 33.26 kg/m².    Physical Exam  Vitals reviewed.   Constitutional:       General: She is not in acute distress.     Appearance: Normal appearance.   HENT:      Head: Normocephalic and atraumatic.      Nose: Nose normal.      Mouth/Throat:      Mouth: Mucous membranes are moist.   Cardiovascular:      Rate and Rhythm: Normal rate and regular rhythm.      Pulses: Normal pulses.           Radial pulses are 2+ on the right side.        Femoral pulses are 2+ on the right side.     Heart sounds: S1 normal and S2 normal. Murmur (2/6, systolic, LUSB, radiation to back/left scapula) heard.      No friction rub. No gallop.   Pulmonary:      Effort: Pulmonary effort is normal.      Breath sounds: Normal breath sounds.   Abdominal:      General: There is no distension.      Palpations: Abdomen is soft.      Tenderness: There is no abdominal tenderness.   Skin:     General: Skin is warm and dry. "      Capillary Refill: Capillary refill takes less than 2 seconds.   Neurological:      General: No focal deficit present.      Mental Status: She is alert.          Electrocardiogram:  Normal sinus rhythm with normal cardiac intervals, superior axis deviation, nonspecific ST-T changes, QT= 0.43    Echocardiogram:  Coarctation of the aorta  -Status post aortic arch reconstruction with bovine pericardium, Dr. Young [facility], 2002.  Mild narrowing of the aorta at the level of the isthmus (1.0 cm Z score~-2.79)  Mildly increased flow velocity through the aortic arch (peak velocity 3.3 m/s, peak gradient 44 mm Hg and a mean of 24 mm  Hg).  Bicuspid aortic valve with moderate aortic valve insufficiency via two separate jets.  No aortic valve stenosis.  Mildly enlarged left ventricle with normal systolic function  Normal mitral valve velocity.  Trivial mitral valve insufficiency.      Adolph Alonso MD  [facility] - [address] [address]  Dept: [phone]  Dept Fax: [phone]

## 2025-07-15 DIAGNOSIS — Q24.8 SHONE'S SYNDROME: ICD-10-CM

## 2025-07-15 DIAGNOSIS — Q25.1 COARCTATION OF AORTA: Primary | ICD-10-CM

## 2025-07-15 DIAGNOSIS — Z87.898 HISTORY OF PROLONGED Q-T INTERVAL ON ECG: ICD-10-CM

## 2025-07-15 NOTE — ASSESSMENT & PLAN NOTE
Moira's electrocardiogram at her last visit in May 2024 did not demonstrate QTc prolongation but it has been borderline to mildly elevated in the past. I would recommend she continue to avoid drugs on the QT prolonging drug list but if not possible, she would require EKG monitoring while taking these. I will continue to monitor the QTc closely over time. QTc 0.46 today.

## 2025-07-15 NOTE — ASSESSMENT & PLAN NOTE
Moira is status post a successful aortic arch reconstruction and coarctation repair. Her mild residual arch narrowing has remained stable over the last several evaluations. Additionally, she has excellent lower extremity pulses and no upper/lower extremity blood pressure gradient.  At this point, the residual coarctation does not require treatment. I will continue to monitor over time, next echo July 2027.

## 2025-07-15 NOTE — ASSESSMENT & PLAN NOTE
Patients with a history of coarctation are at increased risk for hypertension. Today, Moira's blood pressure is mildly elevated. I would expect a maximal blood pressure of approximately 130/85 mm Hg. I will continue to monitor her blood pressure over time. 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

## 2025-07-15 NOTE — ASSESSMENT & PLAN NOTE
Moira has a bicuspid aortic valve with no stenosis and mmild valve insufficiency. There is no associated ascending aorta dilation. This remained stable since her evaluation on her most recent evaluations. She requires no treatment at this point. Although unlikely to cause her difficulty until she ages further, she will need to be followed for the development of aortic stenosis and for ascending aortopathy.

## 2025-07-16 ENCOUNTER — OFFICE VISIT (OUTPATIENT)
Dept: PEDIATRIC CARDIOLOGY | Facility: CLINIC | Age: 25
End: 2025-07-16
Payer: MEDICAID

## 2025-07-16 ENCOUNTER — CLINICAL SUPPORT (OUTPATIENT)
Dept: PEDIATRIC CARDIOLOGY | Facility: CLINIC | Age: 25
End: 2025-07-16
Payer: MEDICAID

## 2025-07-16 ENCOUNTER — HOSPITAL ENCOUNTER (OUTPATIENT)
Dept: PEDIATRIC CARDIOLOGY | Facility: HOSPITAL | Age: 25
Discharge: HOME | End: 2025-07-16
Attending: PEDIATRICS
Payer: MEDICAID

## 2025-07-16 VITALS
HEART RATE: 71 BPM | WEIGHT: 221.56 LBS | RESPIRATION RATE: 20 BRPM | SYSTOLIC BLOOD PRESSURE: 128 MMHG | OXYGEN SATURATION: 100 % | DIASTOLIC BLOOD PRESSURE: 72 MMHG | HEIGHT: 66 IN | BODY MASS INDEX: 35.61 KG/M2

## 2025-07-16 DIAGNOSIS — Z87.898 HISTORY OF PROLONGED Q-T INTERVAL ON ECG: ICD-10-CM

## 2025-07-16 DIAGNOSIS — Q24.8 SHONE'S SYNDROME: Primary | ICD-10-CM

## 2025-07-16 DIAGNOSIS — I35.1 NONRHEUMATIC AORTIC VALVE INSUFFICIENCY: ICD-10-CM

## 2025-07-16 DIAGNOSIS — R03.0 ELEVATED BLOOD PRESSURE READING: ICD-10-CM

## 2025-07-16 DIAGNOSIS — Q23.81 BICUSPID AORTIC VALVE: ICD-10-CM

## 2025-07-16 DIAGNOSIS — Q24.8 SHONE'S SYNDROME: ICD-10-CM

## 2025-07-16 DIAGNOSIS — I35.1 AORTIC VALVE INSUFFICIENCY, ETIOLOGY OF CARDIAC VALVE DISEASE UNSPECIFIED: ICD-10-CM

## 2025-07-16 DIAGNOSIS — Q25.1 COARCTATION OF AORTA: ICD-10-CM

## 2025-07-16 LAB
BSA FOR ECHO PROCEDURE: 2.16 M2
OHS CV CPX PATIENT HEIGHT IN: 65.79
OHS QRS DURATION: 80 MS
OHS QTC CALCULATION: 469 MS

## 2025-07-16 PROCEDURE — 99999 PR PBB SHADOW E&M-EST. PATIENT-LVL III: CPT | Mod: PBBFAC,,, | Performed by: PEDIATRICS

## 2025-07-16 PROCEDURE — 93303 ECHO TRANSTHORACIC: CPT | Mod: 26,,, | Performed by: PEDIATRICS

## 2025-07-16 PROCEDURE — 99214 OFFICE O/P EST MOD 30 MIN: CPT | Mod: S$PBB,25,, | Performed by: PEDIATRICS

## 2025-07-16 PROCEDURE — 93320 DOPPLER ECHO COMPLETE: CPT

## 2025-07-16 PROCEDURE — 1159F MED LIST DOCD IN RCRD: CPT | Mod: CPTII,,, | Performed by: PEDIATRICS

## 2025-07-16 PROCEDURE — 1160F RVW MEDS BY RX/DR IN RCRD: CPT | Mod: CPTII,,, | Performed by: PEDIATRICS

## 2025-07-16 PROCEDURE — 93320 DOPPLER ECHO COMPLETE: CPT | Mod: 26,,, | Performed by: PEDIATRICS

## 2025-07-16 PROCEDURE — 3078F DIAST BP <80 MM HG: CPT | Mod: CPTII,,, | Performed by: PEDIATRICS

## 2025-07-16 PROCEDURE — 3008F BODY MASS INDEX DOCD: CPT | Mod: CPTII,,, | Performed by: PEDIATRICS

## 2025-07-16 PROCEDURE — 3074F SYST BP LT 130 MM HG: CPT | Mod: CPTII,,, | Performed by: PEDIATRICS

## 2025-07-16 PROCEDURE — 93010 ELECTROCARDIOGRAM REPORT: CPT | Mod: S$PBB,,, | Performed by: PEDIATRICS

## 2025-07-16 PROCEDURE — 93325 DOPPLER ECHO COLOR FLOW MAPG: CPT | Mod: 26,,, | Performed by: PEDIATRICS

## 2025-07-16 PROCEDURE — 99213 OFFICE O/P EST LOW 20 MIN: CPT | Mod: PBBFAC,25 | Performed by: PEDIATRICS

## 2025-07-16 PROCEDURE — 93005 ELECTROCARDIOGRAM TRACING: CPT | Mod: PBBFAC | Performed by: PEDIATRICS

## 2025-07-16 RX ORDER — SERTRALINE HYDROCHLORIDE 25 MG/1
25 TABLET, FILM COATED ORAL
COMMUNITY

## 2025-07-16 NOTE — PROGRESS NOTES
Thank you for referring your patient Moira Bravo to the Pediatric Cardiology clinic for consultation. Please review my findings below and feel free to contact for me for any questions or concerns.    Moira Bravo is a 24 y.o. female seen in clinic today alone for bicuspid aortic valve and .     ASSESSMENT/PLAN:  1. Shone's syndrome    2. Bicuspid aortic valve  Assessment & Plan:  Moira has a bicuspid aortic valve with no stenosis and mmild valve insufficiency. There is no associated ascending aorta dilation. This remained stable since her evaluation on her most recent evaluations. She requires no treatment at this point. Although unlikely to cause her difficulty until she ages further, she will need to be followed for the development of aortic stenosis and for ascending aortopathy.      3. Nonrheumatic aortic valve insufficiency  Assessment & Plan:  Mild aortic insufficiency       4. Coarctation of aorta  Assessment & Plan:  Moira is status post a successful aortic arch reconstruction and coarctation repair. Her mild residual arch narrowing has remained stable over the last several evaluations. Additionally, she has excellent lower extremity pulses and no upper/lower extremity blood pressure gradient.  At this point, the residual coarctation does not require treatment. I will continue to monitor over time, next echo July 2027.      5. Elevated blood pressure reading  Assessment & Plan:  Patients with a history of coarctation are at increased risk for hypertension. Today, Moira's blood pressure is mildly elevated. I would expect a maximal blood pressure of approximately 130/85 mm Hg. I will continue to monitor her blood pressure over time. 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      6. History of prolonged Q-T interval on ECG  Assessment & Plan:  Moira's electrocardiogram at her last visit in  May 2024 did not demonstrate QTc prolongation but it has been borderline to mildly elevated in the past. I would recommend she continue to avoid drugs on the QT prolonging drug list but if not possible, she would require EKG monitoring while taking these. I will continue to monitor the QTc closely over time. QTc 0.46 today.      Preventive Medicine:  SBE prophylaxis - None indicated  Exercise - No activity restrictions    Follow Up:  Follow up in about 2 years (around 7/16/2027) for Recheck with EKG and Echo.      SUBJECTIVE:  PAOLA Pizarro is a 24 y.o. whom we previously evaluated for a bicuspid aortic valve with no stenosis and moderate aortic valve insufficiency. There is no associated ascending aorta dilation. This remained stable since her most recent evaluations. She was last seen 1 year ago and returns today for follow up. There are no complaints of chest pain, dizziness, shortness of breath, palpitations, tachycardia, decreased activity, exercise intolerance, documented arrhythmias, or syncope.    Review of patient's allergies indicates:   Allergen Reactions    Metoclopramide Rash     Current Medications[1]  Past Medical History:   Diagnosis Date    Aortic valve insufficiency     Bicuspid aortic valve     Coarctation of aorta     s/p repair    History of MRI of brain 01/2021    no berry aneurysms noted    Seasonal allergies       Past Surgical History:   Procedure Laterality Date    AORTIC ARCH REPAIR  2002    with bovine pericardium by Dr[facility] VALERIE [facility] at [facility]     Family History   Problem Relation Name Age of Onset    Stroke Mother      Heart attacks under age 50 Father  34        s/p stent placement    Congenital heart disease Brother      Hyperlipidemia Brother      Diabetes Maternal Grandmother      Diabetes Paternal Grandmother      Hyperlipidemia Paternal Grandmother      There is no direct family history of sudden death, arrythmia, hypertension, cancer , or other inheritable  "disorders.  Social History[2]    Interval Hospitalizations/Procedures:  none    Review of Systems   A comprehensive review of symptoms was completed and negative except as noted above.    OBJECTIVE:  Vital signs  Vitals:    07/16/25 1005 07/16/25 1015 07/16/25 1027   BP: (!) 142/69 (!) 132/102 128/72   BP Location: Right arm Left leg Right arm   Patient Position: Lying Sitting Sitting   Pulse: 71     Resp: 20     SpO2: 100%     Weight: 100.5 kg (221 lb 9 oz)     Height: 5' 5.79" (1.671 m)          Body mass index is 35.99 kg/m².    Physical Exam  Vitals reviewed.   Constitutional:       General: She is not in acute distress.     Appearance: Normal appearance. She is obese. She is not ill-appearing, toxic-appearing or diaphoretic.   HENT:      Head: Normocephalic and atraumatic.      Mouth/Throat:      Mouth: Mucous membranes are moist.   Cardiovascular:      Rate and Rhythm: Normal rate and regular rhythm.      Pulses: Normal pulses.           Radial pulses are 2+ on the right side.        Femoral pulses are 2+ on the right side.     Heart sounds: S1 normal and S2 normal. Murmur (1/6, systolic, LUSB, radiation to back/left scapula) heard.      No friction rub. No gallop.   Pulmonary:      Effort: Pulmonary effort is normal.      Breath sounds: Normal breath sounds.   Abdominal:      General: There is no distension.      Palpations: Abdomen is soft.      Tenderness: There is no abdominal tenderness.   Skin:     General: Skin is warm and dry.      Capillary Refill: Capillary refill takes less than 2 seconds.   Neurological:      General: No focal deficit present.      Mental Status: She is alert.   Psychiatric:         Mood and Affect: Mood normal.        Electrocardiogram:  Normal sinus rhythm with normal atrial and ventricular forces, borderline prolonged QTc 0.46    Echocardiogram:  Coarctation of the aorta  -Status post aortic arch reconstruction with bovine pericardium, Dr. Young, [facility], 2002.     Mild " narrowing of the aorta at the level of the isthmus (1.0 cm Z score~-2.81)  Mildly increased flow velocity through the aortic arch (peak velocity 2.8 m/s, peak gradient 32 mm Hg and a mean of 18 mm Hg).  Bicuspid aortic valve with mild aortic valve insufficiency via two separate jets.   No aortic valve stenosis.  Borderline enlarged left ventricle with normal systolic function  Normal mitral valve velocity.  Trivial mitral valve insufficiency.        Adolph Alonso MD  [facility]  [id number] [place] [id number]  Dept: [phone]  Dept Fax: [phone]          [1]   Current Outpatient Medications:     levonorgestreL (MIRENA) 52 mg IUD, 1 each by Intrauterine route., Disp: , Rfl:     sertraline (ZOLOFT) 25 MG tablet, Take 25 mg by mouth., Disp: , Rfl:   [2]   Social History  Socioeconomic History    Marital status: Single   Tobacco Use    Smoking status: Unknown   Social History Narrative    Lives with: daughter and her daughter's dad    Graduated LPN nursing school    Active: light activity    No smokers    Caffeine: no